# Patient Record
(demographics unavailable — no encounter records)

---

## 2025-02-05 NOTE — HISTORY OF PRESENT ILLNESS
[FreeTextEntry1] : Ian Pham is a 72 year old male with PMH of COPD, HLD, HTN, T2DM, pAF (on Xarelto), vitamin D deficiency, former smoker, NAFLD, B12 deficiency, OA of bilateral knees who presents for initial consultation of dizziness.  Patient reports waking up one morning with dizziness, spinning sensation and ended up vomiting. He felt weak, head heaviness and couldn't stand up and evaluated in the ED in which HCT was normal and was discharged with Meclizine. He reports recurrent visits to the ED in October and January with similar symptoms. He was recently seen by ENT and is getting a VNG this Friday and is following up with them next week. He reports taking Meclizine 1/2 pill 3 times per day when needed but reports no Meclizine in 4 days. He reports that the dizziness usually lasts for a few hours and has to lay down. He denies any associated headaches, visual changes, speech changes, numbness/tingling or one-sided weakness. He denies any falls. He reports no previous vestibular therapy. BP today 114/76. MRI head from 2025 with no acute infarct and . He reports recent lab work with his PCP with normal B12 levels.

## 2025-02-05 NOTE — PHYSICAL EXAM
[FreeTextEntry1] : Alert. Fully oriented. Speech and language are intact. Cranial nerves II-XII are intact. No nystagmus noted. Motor exam reveals intact strength with individual muscle testing in bilateral upper and lower extremities. No drift. Sensation is intact to light touch in distal extremities. Finger-to-nose is intact. Rapid alternating movements are normal in the upper and lower extremities. Gait is normal.

## 2025-02-05 NOTE — ASSESSMENT
[FreeTextEntry1] : Ian Pham is a 72 year old male with PMH of COPD, HLD, HTN, T2DM, pAF (on Xarelto), vitamin D deficiency, former smoker, NAFLD, B12 deficiency, OA of bilateral knees who presents for initial consultation of dizziness.  Plan: -MRI IAC w/wo contrast to r/o cerebellar stroke; low suspicion -Start vestibular therapy; contact information given for [facility] -F/u with ENT for VNG results -Continue Meclizine PRN -Encouraged healthy lifestyle habits including regular exercise, goal of 8 hours of sleep, adequate hydration and stress management -RTO if symptoms do not improve; will call with MRI results